# Patient Record
Sex: FEMALE | Race: WHITE | NOT HISPANIC OR LATINO | Employment: OTHER | URBAN - METROPOLITAN AREA
[De-identification: names, ages, dates, MRNs, and addresses within clinical notes are randomized per-mention and may not be internally consistent; named-entity substitution may affect disease eponyms.]

---

## 2022-05-03 ENCOUNTER — TELEPHONE (OUTPATIENT)
Dept: INFECTIOUS DISEASES | Facility: CLINIC | Age: 32
End: 2022-05-03
Payer: MEDICAID

## 2022-05-06 NOTE — TELEPHONE ENCOUNTER
Attempted to contact patient. No answer. Voicemail left to call clinic back. Text message sent to patient via company phone to call the clinic back. Unable to reach letter mailed to patient.

## 2022-06-08 ENCOUNTER — TELEPHONE (OUTPATIENT)
Dept: INFECTIOUS DISEASES | Facility: CLINIC | Age: 32
End: 2022-06-08

## 2022-06-08 ENCOUNTER — HOSPITAL ENCOUNTER (OUTPATIENT)
Dept: RADIOLOGY | Facility: HOSPITAL | Age: 32
Discharge: HOME | End: 2022-06-08
Attending: NURSE PRACTITIONER
Payer: MEDICAID

## 2022-06-08 ENCOUNTER — OFFICE VISIT (OUTPATIENT)
Dept: INFECTIOUS DISEASES | Facility: CLINIC | Age: 32
End: 2022-06-08
Payer: MEDICAID

## 2022-06-08 VITALS
TEMPERATURE: 99 F | HEART RATE: 112 BPM | RESPIRATION RATE: 18 BRPM | WEIGHT: 152.31 LBS | BODY MASS INDEX: 24.48 KG/M2 | SYSTOLIC BLOOD PRESSURE: 120 MMHG | HEIGHT: 66 IN | DIASTOLIC BLOOD PRESSURE: 77 MMHG

## 2022-06-08 DIAGNOSIS — B20 AIDS (ACQUIRED IMMUNODEFICIENCY SYNDROME), CD4 <=200: Primary | ICD-10-CM

## 2022-06-08 DIAGNOSIS — A74.9 CHLAMYDIA INFECTION: Primary | ICD-10-CM

## 2022-06-08 DIAGNOSIS — Z72.0 TOBACCO ABUSE: ICD-10-CM

## 2022-06-08 DIAGNOSIS — B20 AIDS (ACQUIRED IMMUNODEFICIENCY SYNDROME), CD4 <=200: ICD-10-CM

## 2022-06-08 DIAGNOSIS — E55.9 VITAMIN D DEFICIENCY: ICD-10-CM

## 2022-06-08 DIAGNOSIS — Z11.3 ROUTINE SCREENING FOR STI (SEXUALLY TRANSMITTED INFECTION): ICD-10-CM

## 2022-06-08 LAB
ALBUMIN SERPL-MCNC: 4.1 GM/DL (ref 3.5–5)
ALBUMIN/GLOB SERPL: 0.8 RATIO (ref 1.1–2)
ALP SERPL-CCNC: 161 UNIT/L (ref 40–150)
ALT SERPL-CCNC: 18 UNIT/L (ref 0–55)
AST SERPL-CCNC: 22 UNIT/L (ref 5–34)
BASOPHILS # BLD AUTO: 0.01 X10(3)/MCL (ref 0–0.2)
BASOPHILS NFR BLD AUTO: 0.2 %
BILIRUBIN DIRECT+TOT PNL SERPL-MCNC: 0.5 MG/DL
BUN SERPL-MCNC: 10.8 MG/DL (ref 8.9–20.6)
C TRACH DNA SPEC QL NAA+PROBE: DETECTED
CALCIUM SERPL-MCNC: 9.8 MG/DL (ref 8.4–10.2)
CHLORIDE SERPL-SCNC: 98 MMOL/L (ref 98–107)
CO2 SERPL-SCNC: 25 MMOL/L (ref 22–29)
CREAT SERPL-MCNC: 0.79 MG/DL (ref 0.73–1.18)
EOSINOPHIL # BLD AUTO: 0.02 X10(3)/MCL (ref 0–0.9)
EOSINOPHIL NFR BLD AUTO: 0.4 %
ERYTHROCYTE [DISTWIDTH] IN BLOOD BY AUTOMATED COUNT: 11.8 % (ref 11.5–17)
GLOBULIN SER-MCNC: 5.4 GM/DL (ref 2.4–3.5)
GLUCOSE SERPL-MCNC: 94 MG/DL (ref 74–100)
HCT VFR BLD AUTO: 45.4 % (ref 42–52)
HGB BLD-MCNC: 15.7 GM/DL (ref 14–18)
IMM GRANULOCYTES # BLD AUTO: 0.04 X10(3)/MCL (ref 0–0.02)
IMM GRANULOCYTES NFR BLD AUTO: 0.8 % (ref 0–0.43)
LYMPHOCYTES # BLD AUTO: 0.86 X10(3)/MCL (ref 0.6–4.6)
LYMPHOCYTES NFR BLD AUTO: 17.7 %
MCH RBC QN AUTO: 30.8 PG (ref 27–31)
MCHC RBC AUTO-ENTMCNC: 34.6 MG/DL (ref 33–36)
MCV RBC AUTO: 89 FL (ref 80–94)
MONOCYTES # BLD AUTO: 0.33 X10(3)/MCL (ref 0.1–1.3)
MONOCYTES NFR BLD AUTO: 6.8 %
N GONORRHOEA DNA SPEC QL NAA+PROBE: DETECTED
NEUTROPHILS # BLD AUTO: 3.6 X10(3)/MCL (ref 2.1–9.2)
NEUTROPHILS NFR BLD AUTO: 74.1 %
NRBC BLD AUTO-RTO: 0 %
PLATELET # BLD AUTO: 115 X10(3)/MCL (ref 130–400)
PLATELETS.RETICULATED NFR BLD AUTO: 15.4 % (ref 0.9–11.2)
PMV BLD AUTO: 13.9 FL (ref 9.4–12.4)
POTASSIUM SERPL-SCNC: 4.3 MMOL/L (ref 3.5–5.1)
PROT SERPL-MCNC: 9.5 GM/DL (ref 6.4–8.3)
RBC # BLD AUTO: 5.1 X10(6)/MCL (ref 4.7–6.1)
SODIUM SERPL-SCNC: 134 MMOL/L (ref 136–145)
WBC # SPEC AUTO: 4.9 X10(3)/MCL (ref 4.5–11.5)

## 2022-06-08 PROCEDURE — 71046 X-RAY EXAM CHEST 2 VIEWS: CPT | Mod: TC

## 2022-06-08 PROCEDURE — 87491 CHLMYD TRACH DNA AMP PROBE: CPT | Performed by: NURSE PRACTITIONER

## 2022-06-08 PROCEDURE — 0219U NFCT AGT HIV GNRJ SEQ ALYS: CPT | Performed by: NURSE PRACTITIONER

## 2022-06-08 PROCEDURE — 99204 PR OFFICE/OUTPT VISIT, NEW, LEVL IV, 45-59 MIN: ICD-10-PCS | Mod: S$PBB,,, | Performed by: NURSE PRACTITIONER

## 2022-06-08 PROCEDURE — 99204 OFFICE O/P NEW MOD 45 MIN: CPT | Mod: S$PBB,,, | Performed by: NURSE PRACTITIONER

## 2022-06-08 PROCEDURE — 3078F PR MOST RECENT DIASTOLIC BLOOD PRESSURE < 80 MM HG: ICD-10-PCS | Mod: CPTII,,, | Performed by: NURSE PRACTITIONER

## 2022-06-08 PROCEDURE — 1160F PR REVIEW ALL MEDS BY PRESCRIBER/CLIN PHARMACIST DOCUMENTED: ICD-10-PCS | Mod: CPTII,,, | Performed by: NURSE PRACTITIONER

## 2022-06-08 PROCEDURE — 86361 T CELL ABSOLUTE COUNT: CPT | Performed by: NURSE PRACTITIONER

## 2022-06-08 PROCEDURE — 3074F PR MOST RECENT SYSTOLIC BLOOD PRESSURE < 130 MM HG: ICD-10-PCS | Mod: CPTII,,, | Performed by: NURSE PRACTITIONER

## 2022-06-08 PROCEDURE — 99215 OFFICE O/P EST HI 40 MIN: CPT | Mod: PBBFAC,25 | Performed by: NURSE PRACTITIONER

## 2022-06-08 PROCEDURE — 87591 N.GONORRHOEAE DNA AMP PROB: CPT | Performed by: NURSE PRACTITIONER

## 2022-06-08 PROCEDURE — 36415 COLL VENOUS BLD VENIPUNCTURE: CPT | Performed by: NURSE PRACTITIONER

## 2022-06-08 PROCEDURE — 1159F MED LIST DOCD IN RCRD: CPT | Mod: CPTII,,, | Performed by: NURSE PRACTITIONER

## 2022-06-08 PROCEDURE — 3008F PR BODY MASS INDEX (BMI) DOCUMENTED: ICD-10-PCS | Mod: CPTII,,, | Performed by: NURSE PRACTITIONER

## 2022-06-08 PROCEDURE — 80053 COMPREHEN METABOLIC PANEL: CPT | Performed by: NURSE PRACTITIONER

## 2022-06-08 PROCEDURE — 3008F BODY MASS INDEX DOCD: CPT | Mod: CPTII,,, | Performed by: NURSE PRACTITIONER

## 2022-06-08 PROCEDURE — 85025 COMPLETE CBC W/AUTO DIFF WBC: CPT | Performed by: NURSE PRACTITIONER

## 2022-06-08 PROCEDURE — 3078F DIAST BP <80 MM HG: CPT | Mod: CPTII,,, | Performed by: NURSE PRACTITIONER

## 2022-06-08 PROCEDURE — 1160F RVW MEDS BY RX/DR IN RCRD: CPT | Mod: CPTII,,, | Performed by: NURSE PRACTITIONER

## 2022-06-08 PROCEDURE — 1159F PR MEDICATION LIST DOCUMENTED IN MEDICAL RECORD: ICD-10-PCS | Mod: CPTII,,, | Performed by: NURSE PRACTITIONER

## 2022-06-08 PROCEDURE — 3074F SYST BP LT 130 MM HG: CPT | Mod: CPTII,,, | Performed by: NURSE PRACTITIONER

## 2022-06-08 RX ORDER — QUETIAPINE FUMARATE 25 MG/1
25 TABLET, FILM COATED ORAL NIGHTLY
COMMUNITY
Start: 2022-05-05

## 2022-06-08 RX ORDER — MEDROXYPROGESTERONE ACETATE 5 MG/1
5 TABLET ORAL DAILY
COMMUNITY
Start: 2022-05-31

## 2022-06-08 RX ORDER — SPIRONOLACTONE 100 MG/1
100 TABLET, FILM COATED ORAL 2 TIMES DAILY
COMMUNITY
Start: 2022-05-05

## 2022-06-08 RX ORDER — DOXYCYCLINE HYCLATE 100 MG
100 TABLET ORAL EVERY 12 HOURS
Qty: 14 TABLET | Refills: 0 | Status: SHIPPED | OUTPATIENT
Start: 2022-06-08 | End: 2022-06-15

## 2022-06-08 RX ORDER — ERGOCALCIFEROL 1.25 MG/1
50000 CAPSULE ORAL
Qty: 4 CAPSULE | Refills: 4 | Status: SHIPPED | OUTPATIENT
Start: 2022-06-08 | End: 2022-06-08 | Stop reason: SDUPTHER

## 2022-06-08 RX ORDER — ESTRADIOL CYPIONATE 5 MG/ML
INJECTION INTRAMUSCULAR
COMMUNITY
Start: 2022-05-05

## 2022-06-08 RX ORDER — ERGOCALCIFEROL 1.25 MG/1
50000 CAPSULE ORAL
Qty: 4 CAPSULE | Refills: 4 | Status: SHIPPED | OUTPATIENT
Start: 2022-06-08 | End: 2022-09-28 | Stop reason: SDUPTHER

## 2022-06-08 RX ORDER — SULFAMETHOXAZOLE AND TRIMETHOPRIM 800; 160 MG/1; MG/1
1 TABLET ORAL DAILY
Qty: 30 TABLET | Refills: 2 | Status: SHIPPED | OUTPATIENT
Start: 2022-06-08 | End: 2022-09-28 | Stop reason: SDUPTHER

## 2022-06-08 RX ORDER — DEXTROAMPHETAMINE SULFATE, DEXTROAMPHETAMINE SACCHARATE, AMPHETAMINE SULFATE AND AMPHETAMINE ASPARTATE 7.5; 7.5; 7.5; 7.5 MG/1; MG/1; MG/1; MG/1
CAPSULE, EXTENDED RELEASE ORAL EVERY MORNING
COMMUNITY
Start: 2022-05-04

## 2022-06-08 RX ORDER — FLUOXETINE HYDROCHLORIDE 40 MG/1
40 CAPSULE ORAL DAILY
COMMUNITY
Start: 2022-05-27

## 2022-06-08 RX ORDER — AMOXICILLIN 500 MG/1
1000 CAPSULE ORAL 2 TIMES DAILY
COMMUNITY
Start: 2022-06-01 | End: 2022-06-13

## 2022-06-08 RX ORDER — ALPRAZOLAM 1 MG/1
1 TABLET ORAL 3 TIMES DAILY
COMMUNITY
Start: 2022-05-27

## 2022-06-08 NOTE — TELEPHONE ENCOUNTER
Reviewed labs from 6/8/22 rectal CT/GC positive for gonorrhea and chlamydia.  I notified patient. She is going to try to find a ride and will call back when she can come to clinic. Pt advised of the importance of refraining from sex for up to seven days after treatment. Explained that partner will need to be retreated to prevent infection.  I will send Doxycycline 100 mg 1 po BID x 7 days to [facility].  Pt will need Rocephin 500 mg IM with lidocaine as soon as he can come to clinic.

## 2022-06-08 NOTE — PROGRESS NOTES
Subjective:       Patient ID: Stan Odell is a 31 y.o. male.    Chief Complaint: HIV Positive/AIDS    Amber is a 31 WM who identifies as a female who presents today for initial HIV visit. Pt is accompanied by his mother Luciana Le.  Dx 4/7/22. Pt was referred by Dr. Michael Swenson.  MSM, anal receptive. No hx of STIs.  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 She tells me all sexual encounters were protected, but oral sex was preformed unprotected. Pt is  to a male, but currently . She is currently involved with a another male partner who is HIV negative.  HIV PrEP discussed for partner.  Hx of 1 tattoo and multiple piercings all of which were professional. Tells me she was tested 6-9 months ago for HIV and was negative. Smokes THC 2-3 x daily, smokes 1/2 cigs per day, and is drinking hard liquor daily to cope. Pt encouraged to see a counselor. She has one so she will call and schedule. Pt denies HI or SI.  Reviewed labs from 4/28/22 HIV VL [address], CD4 83, HIV GT susceptible to all NRTIs, NNRTIs, PI, Integrase. Only mutation noted was 128T, T Spot neg, cryptococcal antigen neg. CMV IGG +, Toxo IGG neg, Hep A IGG +, Hep B surface ab neg, TSH 0.9578, Vit D 17.1, HLA  neg, G6PD neg, lipid WNL, UDS + amphetamines, benzodiazepines, THC, and opiates.  Pt denies fever, headache, chills, visual problems, N/V/D, SOB, cough, chest pain or abd pain. Pt tells me she has lost a significant amount of weight over the last few months.  She will need a fundus photo and STI screening today. She verbalized understanding.    Spent ~ 1 hour 20 minutes with patient education and coordination of care    Review of Systems   Constitutional: Negative.    HENT: Negative.    Eyes: Negative.    Respiratory: Negative.    Cardiovascular: Negative.    Gastrointestinal: Negative.    Genitourinary: Negative.    Musculoskeletal: Negative.    Integumentary:  Negative.    Neurological: Negative.    Psychiatric/Behavioral: Negative.          Objective:      Physical Exam  Vitals reviewed.   Constitutional:       General: He is not in acute distress.     Appearance: Normal appearance.   HENT:      Head: Normocephalic.      Right Ear: Tympanic membrane, ear canal and external ear normal.      Left Ear: Tympanic membrane, ear canal and external ear normal.      Nose: Nose normal.      Mouth/Throat:      Mouth: Mucous membranes are moist.      Pharynx: Oropharynx is clear.   Eyes:      General: No scleral icterus.     Extraocular Movements: Extraocular movements intact.      Conjunctiva/sclera: Conjunctivae normal.      Pupils: Pupils are equal, round, and reactive to light.   Cardiovascular:      Rate and Rhythm: Normal rate and regular rhythm.      Pulses: Normal pulses.      Heart sounds: Normal heart sounds.   Pulmonary:      Effort: Pulmonary effort is normal. No respiratory distress.      Breath sounds: Normal breath sounds.   Abdominal:      General: Bowel sounds are normal. There is no distension.      Palpations: Abdomen is soft. There is no mass.      Tenderness: There is no abdominal tenderness. There is no right CVA tenderness or left CVA tenderness.      Hernia: No hernia is present.   Musculoskeletal:         General: No tenderness or signs of injury. Normal range of motion.      Cervical back: Normal range of motion and neck supple.      Right lower leg: No edema.      Left lower leg: No edema.   Lymphadenopathy:      Cervical: No cervical adenopathy.   Skin:     General: Skin is warm and dry.      Capillary Refill: Capillary refill takes less than 2 seconds.      Findings: No erythema or lesion.   Neurological:      General: No focal deficit present.      Mental Status: He is alert and oriented to person, place, and time. Mental status is at baseline.   Psychiatric:         Mood and Affect: Mood normal.         Behavior: Behavior normal.         Thought Content: Thought  content normal.         Judgment: Judgment normal.         Assessment:       Problem List Items Addressed This Visit    None     Visit Diagnoses     AIDS (acquired immunodeficiency syndrome), CD4 <=200    -  Primary    Relevant Medications    pmqjsudmw-vnnfmvss-volimtc ala (BIKTARVY) -25 mg (25 kg or greater)    Other Relevant Orders    HIV-1 RNA, Quantitative, PCR with Reflex to Genotype    CD4 Lymphocytes    CBC Auto Differential    Comprehensive Metabolic Panel    Vitamin D deficiency        Relevant Medications    ergocalciferol (VITAMIN D2) 50,000 unit Cap    Routine screening for STI (sexually transmitted infection)        Relevant Orders    Chlamydia/GC, PCR    C.trach/N.gonor AMP RNA, (Non-Genital)    Tobacco abuse        Relevant Orders    Ambulatory referral/consult to Smoking Cessation Program          Plan:       AIDS (acquired immunodeficiency syndrome), CD4 <=200  -     HIV-1 RNA, Quantitative, PCR with Reflex to Genotype; Future; Expected date: 06/08/2022  -     CD4 Lymphocytes; Future; Expected date: 06/08/2022  -     CBC Auto Differential; Future; Expected date: 06/08/2022  -     Comprehensive Metabolic Panel; Future; Expected date: 06/08/2022  -     Discontinue: qxezegcja-occoegny-wwsprmt ala (BIKTARVY) -25 mg (25 kg or greater); Take 1 tablet by mouth once daily.  Dispense: 30 tablet; Refill: 2  -     X-Ray Chest PA And Lateral; Future; Expected date: 06/08/2022  -     oeyxqvaxo-mhbjljjq-afpoqov ala (BIKTARVY) -25 mg (25 kg or greater); Take 1 tablet by mouth once daily.  Dispense: 30 tablet; Refill: 2  -     Ambulatory referral/consult to Ophthalmology; Future; Expected date: 06/15/2022  -     sulfamethoxazole-trimethoprim 800-160mg (BACTRIM DS) 800-160 mg Tab; Take 1 tablet by mouth once daily.  Dispense: 30 tablet; Refill: 2  HIV disease process and treatment discussed extensively with patient using education materials. Counseled on medication adherence/resistance as well as  importance of attending all scheduled follow up appointments/labs. Sexual protection with condoms recommended.  Plan to start Biktarvy 1 po q day as directed as wells as Bactrim DS 1 po q day for OI prophylaxis.   Pt encouraged to wash all fruits and veggies. Cook meat thoroughly. Avoid handling in litter boxes are gardening at this time.    RTC 6 weeks with Colleen, CXR and repeat labs today.  Fundus photo ordered   Hold vaccines until CD4 improves    Vitamin D deficiency  -     Discontinue: ergocalciferol (VITAMIN D2) 50,000 unit Cap; Take 1 capsule (50,000 Units total) by mouth every 7 days.  Dispense: 4 capsule; Refill: 4  -     ergocalciferol (VITAMIN D2) 50,000 unit Cap; Take 1 capsule (50,000 Units total) by mouth every 7 days.  Dispense: 4 capsule; Refill: 4  Restart Ergocalciferol 50,000 units once weekly. Pt educated on importance of Vit D to bone and organ health.     Routine screening for STI (sexually transmitted infection)  -     Chlamydia/GC, PCR; Future; Expected date: 06/08/2022  -     C.trach/N.gonor AMP RNA, (Non-Genital); Future; Expected date: 06/08/2022  Oral and rectal CT/GC self collected     Tobacco abuse  -     Ambulatory referral/consult to Smoking Cessation Program; Future; Expected date: 06/15/2022  Smoking cessation encouraged.  Pt is not ready to quit.  Discussed benefits of quitting: improved overall health, decreased cardiac/vascular/pulmonary/stroke risks, as well as cost savings.

## 2022-06-09 LAB
AGE: 29
CD3+CD4+ CELLS # BLD: 18 % (ref 28–48)
CD3+CD4+ CELLS # SPEC: 77.62 UNIT/L (ref 589–1505)
CD3+CD4+ CELLS NFR BLD: 8.8 %
LYMPHOCYTES # BLD AUTO: 882 X10(3)/MCL (ref 1260–5520)
LYMPHOMA - T-CELL MARKERS SPEC-IMP: ABNORMAL
WBC # BLD AUTO: 4900 /MM3 (ref 4500–11500)

## 2022-06-11 LAB
C TRACH RRNA SPEC QL NAA+PROBE: NEGATIVE
HIV1 RNA # PLAS NAA DL=20: ABNORMAL COPIES/ML
N GONORRHOEA RRNA SPEC QL NAA+PROBE: NEGATIVE
SPECIMEN SOURCE: NORMAL
SPECIMEN SOURCE: NORMAL

## 2022-06-13 ENCOUNTER — TELEPHONE (OUTPATIENT)
Dept: INFECTIOUS DISEASES | Facility: CLINIC | Age: 32
End: 2022-06-13

## 2022-06-13 ENCOUNTER — CLINICAL SUPPORT (OUTPATIENT)
Dept: INFECTIOUS DISEASES | Facility: CLINIC | Age: 32
End: 2022-06-13
Payer: MEDICAID

## 2022-06-13 DIAGNOSIS — A54.9 GONORRHEA: Primary | ICD-10-CM

## 2022-06-13 DIAGNOSIS — A74.9 CHLAMYDIA INFECTION: Primary | ICD-10-CM

## 2022-06-13 PROCEDURE — 96372 THER/PROPH/DIAG INJ SC/IM: CPT | Mod: PBBFAC

## 2022-06-13 PROCEDURE — 99211 OFF/OP EST MAY X REQ PHY/QHP: CPT | Mod: 25,PBBFAC

## 2022-06-13 RX ORDER — LIDOCAINE HYDROCHLORIDE 10 MG/ML
1 INJECTION INFILTRATION; PERINEURAL
Status: COMPLETED | OUTPATIENT
Start: 2022-06-13 | End: 2022-06-13

## 2022-06-13 RX ORDER — CEFTRIAXONE 1 G/1
0.5 INJECTION, POWDER, FOR SOLUTION INTRAMUSCULAR; INTRAVENOUS
Status: COMPLETED | OUTPATIENT
Start: 2022-06-13 | End: 2022-06-13

## 2022-06-13 RX ADMIN — CEFTRIAXONE SODIUM 0.5 G: 500 INJECTION, POWDER, FOR SOLUTION INTRAMUSCULAR; INTRAVENOUS at 03:06

## 2022-06-13 RX ADMIN — LIDOCAINE HYDROCHLORIDE 1 ML: 10 INJECTION INFILTRATION; PERINEURAL at 03:06

## 2022-06-13 NOTE — TELEPHONE ENCOUNTER
----- Message from Migdalia Treviño sent at 6/13/2022 12:13 PM CDT -----  Regarding: Pt called  #KATYA PT#  Pt called wanted to let Katya know that he will come today @3:00 to get his shots.  [phone]

## 2022-06-16 LAB — HIV 1 RNA RT + PR + IN MUT DET PLAS SEQ: NORMAL

## 2022-09-28 DIAGNOSIS — E55.9 VITAMIN D DEFICIENCY: ICD-10-CM

## 2022-09-28 DIAGNOSIS — B20 AIDS (ACQUIRED IMMUNODEFICIENCY SYNDROME), CD4 <=200: ICD-10-CM

## 2022-09-28 RX ORDER — ERGOCALCIFEROL 1.25 MG/1
50000 CAPSULE ORAL
Qty: 4 CAPSULE | Refills: 0 | Status: SHIPPED | OUTPATIENT
Start: 2022-09-28 | End: 2023-02-01 | Stop reason: SDUPTHER

## 2022-09-28 RX ORDER — SULFAMETHOXAZOLE AND TRIMETHOPRIM 800; 160 MG/1; MG/1
1 TABLET ORAL DAILY
Qty: 30 TABLET | Refills: 0 | Status: SHIPPED | OUTPATIENT
Start: 2022-09-28 | End: 2022-10-25

## 2022-09-28 NOTE — TELEPHONE ENCOUNTER
----- Message from Migdalia Treviño sent at 9/28/2022 11:52 AM CDT -----  Regarding: Pt called  #KATYA#    Pt request new Rx Vit D/Bactrim/Biktarvy  [facility]        Pt call back number [phone].

## 2022-11-07 ENCOUNTER — TELEPHONE (OUTPATIENT)
Dept: INFECTIOUS DISEASES | Facility: CLINIC | Age: 32
End: 2022-11-07
Payer: MEDICAID

## 2022-11-07 NOTE — TELEPHONE ENCOUNTER
Spoke to Joseph, she stated pt had labs done there today and seen the doctor last week, I informed her that pt is scheduled for an appt on tomorrow, she will fax his labs and last office note

## 2022-11-07 NOTE — TELEPHONE ENCOUNTER
----- Message from Jo-Ann Wolff sent at 11/7/2022 12:24 PM CST -----  Regarding: Lab results  KATYA Ruiz from [facility] called stating that the patients platelet count is low.   She can be reached @  [phone]

## 2022-11-10 ENCOUNTER — TELEPHONE (OUTPATIENT)
Dept: INFECTIOUS DISEASES | Facility: CLINIC | Age: 32
End: 2022-11-10
Payer: MEDICAID

## 2022-11-10 NOTE — TELEPHONE ENCOUNTER
Mara,     I reviewed labs from [facility] done 11/1/22 platelets are 95.  Pt will need further workup by his PCP. I have not seen patient in the clinic since 6/8/22. I started him on Biktarvy and he was taking Bactrim, but I have not seen him back in clinic for follow up. Pt does not have a an appt scheduled. Can you send information to his PCP and schedule an appointment to see me? Labs are scanned in the computer.    Thank you,   KASIA Esteban

## 2022-11-11 NOTE — TELEPHONE ENCOUNTER
Faxed lab results to Dr. Kelly at [facility] per Colleen's request. Attempted to contact patient to schedule an appt. Number is not reachable at this time.

## 2022-11-15 NOTE — TELEPHONE ENCOUNTER
"Attempted to contact patient. "Wireless customer you are calling is unavailable." Not able to send letter due to invalid home address.   "

## 2023-02-01 ENCOUNTER — HOSPITAL ENCOUNTER (OUTPATIENT)
Dept: RADIOLOGY | Facility: HOSPITAL | Age: 33
Discharge: HOME | End: 2023-02-01
Attending: NURSE PRACTITIONER
Payer: MEDICAID

## 2023-02-01 ENCOUNTER — OFFICE VISIT (OUTPATIENT)
Dept: INFECTIOUS DISEASES | Facility: CLINIC | Age: 33
End: 2023-02-01
Payer: MEDICAID

## 2023-02-01 VITALS
HEART RATE: 100 BPM | BODY MASS INDEX: 24.86 KG/M2 | HEIGHT: 66 IN | TEMPERATURE: 99 F | SYSTOLIC BLOOD PRESSURE: 108 MMHG | RESPIRATION RATE: 16 BRPM | WEIGHT: 154.69 LBS | DIASTOLIC BLOOD PRESSURE: 67 MMHG

## 2023-02-01 DIAGNOSIS — K59.00 CONSTIPATION, UNSPECIFIED CONSTIPATION TYPE: ICD-10-CM

## 2023-02-01 DIAGNOSIS — B20 AIDS (ACQUIRED IMMUNODEFICIENCY SYNDROME), CD4 <=200: Primary | ICD-10-CM

## 2023-02-01 DIAGNOSIS — E55.9 VITAMIN D DEFICIENCY: ICD-10-CM

## 2023-02-01 DIAGNOSIS — F17.210 CIGARETTE NICOTINE DEPENDENCE WITHOUT COMPLICATION: ICD-10-CM

## 2023-02-01 DIAGNOSIS — K62.5 RECTAL BLEED: ICD-10-CM

## 2023-02-01 DIAGNOSIS — Z11.3 ROUTINE SCREENING FOR STI (SEXUALLY TRANSMITTED INFECTION): ICD-10-CM

## 2023-02-01 LAB
ALBUMIN SERPL-MCNC: 4.1 G/DL (ref 3.5–5)
ALBUMIN/GLOB SERPL: 0.9 RATIO (ref 1.1–2)
ALP SERPL-CCNC: 138 UNIT/L (ref 40–150)
ALT SERPL-CCNC: 17 UNIT/L (ref 0–55)
APPEARANCE UR: ABNORMAL
AST SERPL-CCNC: 16 UNIT/L (ref 5–34)
BACTERIA #/AREA URNS AUTO: ABNORMAL /HPF
BASOPHILS # BLD AUTO: 0.03 X10(3)/MCL (ref 0–0.2)
BASOPHILS NFR BLD AUTO: 0.3 %
BILIRUB UR QL STRIP.AUTO: NEGATIVE MG/DL
BILIRUBIN DIRECT+TOT PNL SERPL-MCNC: 0.4 MG/DL
BUN SERPL-MCNC: 14.4 MG/DL (ref 8.9–20.6)
C TRACH DNA SPEC QL NAA+PROBE: DETECTED
CALCIUM SERPL-MCNC: 9.8 MG/DL (ref 8.4–10.2)
CHLORIDE SERPL-SCNC: 97 MMOL/L (ref 98–107)
CHOLEST SERPL-MCNC: 146 MG/DL
CHOLEST/HDLC SERPL: 3 {RATIO} (ref 0–5)
CO2 SERPL-SCNC: 26 MMOL/L (ref 22–29)
COLOR UR AUTO: ABNORMAL
CREAT SERPL-MCNC: 0.83 MG/DL (ref 0.73–1.18)
DEPRECATED CALCIDIOL+CALCIFEROL SERPL-MC: 18.9 NG/ML (ref 30–80)
EOSINOPHIL # BLD AUTO: 0.03 X10(3)/MCL (ref 0–0.9)
EOSINOPHIL NFR BLD AUTO: 0.3 %
ERYTHROCYTE [DISTWIDTH] IN BLOOD BY AUTOMATED COUNT: 12.4 % (ref 11.5–17)
GFR SERPLBLD CREATININE-BSD FMLA CKD-EPI: >60 MLS/MIN/1.73/M2
GLOBULIN SER-MCNC: 4.8 GM/DL (ref 2.4–3.5)
GLUCOSE SERPL-MCNC: 92 MG/DL (ref 74–100)
GLUCOSE UR QL STRIP.AUTO: NORMAL MG/DL
HCT VFR BLD AUTO: 44 % (ref 42–52)
HCV AB SERPL QL IA: NONREACTIVE
HDLC SERPL-MCNC: 44 MG/DL (ref 35–60)
HGB BLD-MCNC: 14.9 GM/DL (ref 14–18)
HYALINE CASTS #/AREA URNS LPF: ABNORMAL /LPF
IMM GRANULOCYTES # BLD AUTO: 0.02 X10(3)/MCL (ref 0–0.04)
IMM GRANULOCYTES NFR BLD AUTO: 0.2 %
KETONES UR QL STRIP.AUTO: ABNORMAL MG/DL
LDLC SERPL CALC-MCNC: 89 MG/DL (ref 50–140)
LEUKOCYTE ESTERASE UR QL STRIP.AUTO: NEGATIVE UNIT/L
LYMPHOCYTES # BLD AUTO: 1.5 X10(3)/MCL (ref 0.6–4.6)
LYMPHOCYTES NFR BLD AUTO: 16.8 %
MCH RBC QN AUTO: 30.7 PG
MCHC RBC AUTO-ENTMCNC: 33.9 MG/DL (ref 33–36)
MCV RBC AUTO: 90.5 FL (ref 80–94)
MONOCYTES # BLD AUTO: 0.49 X10(3)/MCL (ref 0.1–1.3)
MONOCYTES NFR BLD AUTO: 5.5 %
MUCOUS THREADS URNS QL MICRO: ABNORMAL /LPF
N GONORRHOEA DNA SPEC QL NAA+PROBE: NOT DETECTED
NEUTROPHILS # BLD AUTO: 6.88 X10(3)/MCL (ref 2.1–9.2)
NEUTROPHILS NFR BLD AUTO: 76.9 %
NITRITE UR QL STRIP.AUTO: NEGATIVE
NRBC BLD AUTO-RTO: 0 %
PH UR STRIP.AUTO: 7 [PH]
PLATELET # BLD AUTO: 231 X10(3)/MCL (ref 130–400)
PMV BLD AUTO: 12.9 FL (ref 7.4–10.4)
POTASSIUM SERPL-SCNC: 4.5 MMOL/L (ref 3.5–5.1)
PROT SERPL-MCNC: 8.9 GM/DL (ref 6.4–8.3)
PROT UR QL STRIP.AUTO: NEGATIVE MG/DL
RBC # BLD AUTO: 4.86 X10(6)/MCL (ref 4.7–6.1)
RBC #/AREA URNS AUTO: ABNORMAL /HPF
RBC UR QL AUTO: NEGATIVE UNIT/L
SODIUM SERPL-SCNC: 133 MMOL/L (ref 136–145)
SP GR UR STRIP.AUTO: 1.02
SQUAMOUS #/AREA URNS LPF: ABNORMAL /HPF
T PALLIDUM AB SER QL: NONREACTIVE
TRIGL SERPL-MCNC: 67 MG/DL (ref 34–140)
TSH SERPL-ACNC: 3.36 UIU/ML (ref 0.35–4.94)
UROBILINOGEN UR STRIP-ACNC: NORMAL MG/DL
VLDLC SERPL CALC-MCNC: 13 MG/DL
WBC # SPEC AUTO: 9 X10(3)/MCL (ref 4.5–11.5)
WBC #/AREA URNS AUTO: ABNORMAL /HPF

## 2023-02-01 PROCEDURE — 99215 OFFICE O/P EST HI 40 MIN: CPT | Mod: 25,S$PBB,, | Performed by: NURSE PRACTITIONER

## 2023-02-01 PROCEDURE — 1160F RVW MEDS BY RX/DR IN RCRD: CPT | Mod: CPTII,,, | Performed by: NURSE PRACTITIONER

## 2023-02-01 PROCEDURE — 99406 PR TOBACCO USE CESSATION INTERMEDIATE 3-10 MINUTES: ICD-10-PCS | Mod: S$PBB,,, | Performed by: NURSE PRACTITIONER

## 2023-02-01 PROCEDURE — 1159F PR MEDICATION LIST DOCUMENTED IN MEDICAL RECORD: ICD-10-PCS | Mod: CPTII,,, | Performed by: NURSE PRACTITIONER

## 2023-02-01 PROCEDURE — 87536 HIV-1 QUANT&REVRSE TRNSCRPJ: CPT | Performed by: NURSE PRACTITIONER

## 2023-02-01 PROCEDURE — 86360 T CELL ABSOLUTE COUNT/RATIO: CPT | Performed by: NURSE PRACTITIONER

## 2023-02-01 PROCEDURE — 86803 HEPATITIS C AB TEST: CPT | Performed by: NURSE PRACTITIONER

## 2023-02-01 PROCEDURE — 36415 COLL VENOUS BLD VENIPUNCTURE: CPT | Performed by: NURSE PRACTITIONER

## 2023-02-01 PROCEDURE — 87591 N.GONORRHOEAE DNA AMP PROB: CPT | Performed by: NURSE PRACTITIONER

## 2023-02-01 PROCEDURE — 82306 VITAMIN D 25 HYDROXY: CPT | Performed by: NURSE PRACTITIONER

## 2023-02-01 PROCEDURE — 1160F PR REVIEW ALL MEDS BY PRESCRIBER/CLIN PHARMACIST DOCUMENTED: ICD-10-PCS | Mod: CPTII,,, | Performed by: NURSE PRACTITIONER

## 2023-02-01 PROCEDURE — 99215 PR OFFICE/OUTPT VISIT, EST, LEVL V, 40-54 MIN: ICD-10-PCS | Mod: 25,S$PBB,, | Performed by: NURSE PRACTITIONER

## 2023-02-01 PROCEDURE — 74019 RADEX ABDOMEN 2 VIEWS: CPT | Mod: TC

## 2023-02-01 PROCEDURE — 86480 TB TEST CELL IMMUN MEASURE: CPT | Performed by: NURSE PRACTITIONER

## 2023-02-01 PROCEDURE — 1159F MED LIST DOCD IN RCRD: CPT | Mod: CPTII,,, | Performed by: NURSE PRACTITIONER

## 2023-02-01 PROCEDURE — 81001 URINALYSIS AUTO W/SCOPE: CPT | Performed by: NURSE PRACTITIONER

## 2023-02-01 PROCEDURE — 3074F PR MOST RECENT SYSTOLIC BLOOD PRESSURE < 130 MM HG: ICD-10-PCS | Mod: CPTII,,, | Performed by: NURSE PRACTITIONER

## 2023-02-01 PROCEDURE — 3078F PR MOST RECENT DIASTOLIC BLOOD PRESSURE < 80 MM HG: ICD-10-PCS | Mod: CPTII,,, | Performed by: NURSE PRACTITIONER

## 2023-02-01 PROCEDURE — 3008F PR BODY MASS INDEX (BMI) DOCUMENTED: ICD-10-PCS | Mod: CPTII,,, | Performed by: NURSE PRACTITIONER

## 2023-02-01 PROCEDURE — 80061 LIPID PANEL: CPT | Performed by: NURSE PRACTITIONER

## 2023-02-01 PROCEDURE — 80053 COMPREHEN METABOLIC PANEL: CPT | Performed by: NURSE PRACTITIONER

## 2023-02-01 PROCEDURE — 3008F BODY MASS INDEX DOCD: CPT | Mod: CPTII,,, | Performed by: NURSE PRACTITIONER

## 2023-02-01 PROCEDURE — 99215 OFFICE O/P EST HI 40 MIN: CPT | Mod: PBBFAC | Performed by: NURSE PRACTITIONER

## 2023-02-01 PROCEDURE — 99406 BEHAV CHNG SMOKING 3-10 MIN: CPT | Mod: S$PBB,,, | Performed by: NURSE PRACTITIONER

## 2023-02-01 PROCEDURE — 86780 TREPONEMA PALLIDUM: CPT | Performed by: NURSE PRACTITIONER

## 2023-02-01 PROCEDURE — 85025 COMPLETE CBC W/AUTO DIFF WBC: CPT | Performed by: NURSE PRACTITIONER

## 2023-02-01 PROCEDURE — 87491 CHLMYD TRACH DNA AMP PROBE: CPT | Performed by: NURSE PRACTITIONER

## 2023-02-01 PROCEDURE — 3078F DIAST BP <80 MM HG: CPT | Mod: CPTII,,, | Performed by: NURSE PRACTITIONER

## 2023-02-01 PROCEDURE — 3074F SYST BP LT 130 MM HG: CPT | Mod: CPTII,,, | Performed by: NURSE PRACTITIONER

## 2023-02-01 PROCEDURE — 84443 ASSAY THYROID STIM HORMONE: CPT | Performed by: NURSE PRACTITIONER

## 2023-02-01 RX ORDER — ESTRADIOL 2 MG/1
4 TABLET ORAL DAILY
COMMUNITY

## 2023-02-01 RX ORDER — SULFAMETHOXAZOLE AND TRIMETHOPRIM 800; 160 MG/1; MG/1
1 TABLET ORAL DAILY
Qty: 30 TABLET | Refills: 3 | Status: SHIPPED | OUTPATIENT
Start: 2023-02-01 | End: 2023-02-08 | Stop reason: SDUPTHER

## 2023-02-01 RX ORDER — ERGOCALCIFEROL 1.25 MG/1
50000 CAPSULE ORAL
Qty: 4 CAPSULE | Refills: 3 | Status: SHIPPED | OUTPATIENT
Start: 2023-02-01 | End: 2023-02-08 | Stop reason: SDUPTHER

## 2023-02-01 RX ORDER — BICTEGRAVIR SODIUM, EMTRICITABINE, AND TENOFOVIR ALAFENAMIDE FUMARATE 50; 200; 25 MG/1; MG/1; MG/1
1 TABLET ORAL DAILY
Qty: 30 TABLET | Refills: 3 | Status: SHIPPED | OUTPATIENT
Start: 2023-02-01 | End: 2023-02-08 | Stop reason: SDUPTHER

## 2023-02-01 NOTE — PROGRESS NOTES
Subjective:       Patient ID: Stan Odell is a 32 y.o. male.    Chief Complaint: Follow-up (B20)    2/1/23  Amber is a 33 y/o WM who identifies as female who here for HIV f/u.  Dx 4/7/22. MSM, anal receptive.  Hx of rectal gonorrhea and chlamydia. G6PD neg, HLA  neg. Pt reports 100% adherence to Biktarvy despite not attending appts. She reports non-adherence to Bactrim DS. Reports adherence to Ergocalciferol 50,000 units once weekly.  Reviewed labs from 6/8/22 HIV VL [address]486, CD4 77, rectal CT/GC positive. Pt denies fever, headache, chills, visual problems, N/V/D, SOB, cough, or chest pain. She complains of ongoing constipation and rectal bleeding for approx 1.5 months. Pt states she used a dildo and then started having some rectal bleeding. She is unsure if she injured something or she is has an anal fissure from the constipation. Pt will need some updated labs, x ray abdomen, repeat STI screening and FIT test today. 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  She is still having some depression and is currently seeing a counselor, but would really like to see someone who specializes in HIV.  I will provide patient with a list of counselors today.  She denies SI or HI.  Pt is due for several vaccines. However, CD4 remains to low. Pt is currently smoking 1/2 ppd og cigarettes. Smoking cessation encouraged.    6/8/22  Amber is a 31 WM who identifies as a female who presents today for initial HIV visit. Pt is accompanied by his mother Luciana Le.  Dx 4/7/22. Pt was referred by Dr. Michael Swenson.  MSM, anal receptive. No hx of STIs.  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 She tells me all sexual encounters were protected, but oral sex was preformed unprotected. Pt is  to a male, but currently . She is currently involved with a another male partner who is HIV  negative.  HIV PrEP discussed for partner.  Hx of 1 tattoo and multiple piercings all of which were professional. Tells me she was tested 6-9 months ago for HIV and was negative. Smokes THC 2-3 x daily, smokes 1/2 cigs per day, and is drinking hard liquor daily to cope. Pt encouraged to see a counselor. She has one so she will call and schedule. Pt denies HI or SI.  Reviewed labs from 4/28/22 HIV VL [address], CD4 83, HIV GT susceptible to all NRTIs, NNRTIs, PI, Integrase. Only mutation noted was 128T, T Spot neg, cryptococcal antigen neg. CMV IGG +, Toxo IGG neg, Hep A IGG +, Hep B surface ab neg, TSH 0.9578, Vit D 17.1, HLA  neg, G6PD neg, lipid WNL, UDS + amphetamines, benzodiazepines, THC, and opiates.  Pt denies fever, headache, chills, visual problems, N/V/D, SOB, cough, chest pain or abd pain. Pt tells me she has lost a significant amount of weight over the last few months.  She will need a fundus photo and STI screening today. She verbalized understanding.    Spent ~ 1 hour 20 minutes with patient education and coordination of care.    I spent approx 50 minutes on this patient. This includes face to face time and non-face to face time preparing to see the patient (eg, review of tests), obtaining and/or reviewing separately obtained history, documenting clinical information in the electronic or other health record, independently interpreting results and communicating results to the patient/family/caregiver, or care coordinator.       Review of Systems   Constitutional: Negative.    HENT: Negative.     Eyes: Negative.    Respiratory: Negative.     Cardiovascular: Negative.    Gastrointestinal:  Positive for blood in stool.   Genitourinary: Negative.    Musculoskeletal: Negative.    Integumentary:  Negative.   Neurological: Negative.    Psychiatric/Behavioral: Negative.         Objective:      Physical Exam  Vitals reviewed. Exam conducted with a chaperone present.   Constitutional:       General: He is not in  acute distress.     Appearance: Normal appearance.   HENT:      Head: Normocephalic.      Right Ear: External ear normal.      Left Ear: External ear normal.      Nose: Nose normal.      Mouth/Throat:      Mouth: Mucous membranes are moist.      Pharynx: Oropharynx is clear.   Eyes:      General: No scleral icterus.     Extraocular Movements: Extraocular movements intact.      Conjunctiva/sclera: Conjunctivae normal.      Pupils: Pupils are equal, round, and reactive to light.   Cardiovascular:      Rate and Rhythm: Normal rate and regular rhythm.      Pulses: Normal pulses.      Heart sounds: Normal heart sounds.   Abdominal:      General: Abdomen is flat. Bowel sounds are normal.      Palpations: Abdomen is soft. There is no mass.      Tenderness: There is no abdominal tenderness. There is no right CVA tenderness or left CVA tenderness.   Genitourinary:     Penis: Normal.       Testes: Normal.      Prostate: Normal.      Rectum: No mass, tenderness, anal fissure, external hemorrhoid or internal hemorrhoid. Abnormal anal tone.      Comments: Low anal sphincter tone noted, stool is liquid brown, no blood noted, no mass, hemorrhoid, or fissure noted  Musculoskeletal:         General: No swelling or deformity. Normal range of motion.      Cervical back: Normal range of motion and neck supple.      Right lower leg: No edema.      Left lower leg: No edema.   Lymphadenopathy:      Cervical: No cervical adenopathy.   Skin:     General: Skin is warm and dry.      Capillary Refill: Capillary refill takes less than 2 seconds.      Findings: No erythema or lesion.   Neurological:      General: No focal deficit present.      Mental Status: He is alert and oriented to person, place, and time. Mental status is at baseline.   Psychiatric:         Mood and Affect: Mood normal.         Behavior: Behavior normal.         Thought Content: Thought content normal.         Judgment: Judgment normal.       Assessment:       Problem List  Items Addressed This Visit    None  Visit Diagnoses       AIDS (acquired immunodeficiency syndrome), CD4 <=200    -  Primary    Relevant Medications    ergocalciferol (VITAMIN D2) 50,000 unit Cap    evirgkayv-rswhsyfh-zqikskj ala (BIKTARVY) -25 mg (25 kg or greater)    sulfamethoxazole-trimethoprim 800-160mg (BACTRIM DS) 800-160 mg Tab    Other Relevant Orders    HIV-1 RNA, Quantitative, PCR with Reflex to Genotype    CD4 Lymphocytes    CBC Auto Differential    Comprehensive Metabolic Panel    TSH    Urinalysis    Lipid Panel    Hepatitis C Antibody    Quantiferon Gold TB    Ambulatory referral/consult to Ophthalmology    Vitamin D deficiency        Relevant Medications    ergocalciferol (VITAMIN D2) 50,000 unit Cap    Other Relevant Orders    Vitamin D    Routine screening for STI (sexually transmitted infection)        Relevant Orders    Chlamydia/GC, PCR    C.trach/N.gonor AMP RNA    Chlamydia/GC, PCR    SYPHILIS ANTIBODY (WITH REFLEX RPR)    Rectal bleed        Relevant Orders    OCCULT BLOOD FECAL IMMUNOASSAY    Cigarette nicotine dependence without complication        Constipation, unspecified constipation type        Relevant Orders    X-Ray Abdomen Flat And Erect            Plan:       AIDS (acquired immunodeficiency syndrome), CD4 <=200  -     ergocalciferol (VITAMIN D2) 50,000 unit Cap; Take 1 capsule (50,000 Units total) by mouth every 7 days.  Dispense: 4 capsule; Refill: 3  -     cnwowrbnb-yblucuqt-wmierrl ala (BIKTARVY) -25 mg (25 kg or greater); Take 1 tablet by mouth once daily.  Dispense: 30 tablet; Refill: 3  -     sulfamethoxazole-trimethoprim 800-160mg (BACTRIM DS) 800-160 mg Tab; Take 1 tablet by mouth once daily.  Dispense: 30 tablet; Refill: 3  -     HIV-1 RNA, Quantitative, PCR with Reflex to Genotype; Future; Expected date: 02/01/2023  -     CD4 Lymphocytes; Future; Expected date: 02/01/2023  -     CBC Auto Differential; Future; Expected date: 02/01/2023  -     Comprehensive  Metabolic Panel; Future; Expected date: 02/01/2023  -     TSH; Future; Expected date: 02/01/2023  -     Urinalysis; Future; Expected date: 02/01/2023  -     Lipid Panel; Future; Expected date: 02/01/2023  -     Hepatitis C Antibody; Future; Expected date: 02/01/2023  -     Quantiferon Gold TB; Future; Expected date: 02/01/2023  -     Ambulatory referral/consult to Ophthalmology; Future; Expected date: 02/08/2023  Continue Biktarvy 1 po q day. Restart Bactrim DS 1 po q day for OI prophylaxis.   Pt encouraged to wash all fruits and veggies. Cook meat thoroughly. Avoid handling litter boxes are gardening at this time.    RTC 3 months with Colleen, labs and abd x ray today  FIT test ordered   STI screening repeated 2/1/23  Fundus photo ordered   Hold vaccines until CD4 improves    Vitamin D deficiency  -     ergocalciferol (VITAMIN D2) 50,000 unit Cap; Take 1 capsule (50,000 Units total) by mouth every 7 days.  Dispense: 4 capsule; Refill: 3  -     Vitamin D; Future; Expected date: 02/01/2023  Continue meds. Pt educated on importance of Vit D to bone and organ health.     Routine screening for STI (sexually transmitted infection)  -     Chlamydia/GC, PCR; Future; Expected date: 02/01/2023  -     C.trach/N.gonor AMP RNA; Future; Expected date: 02/01/2023  -     Chlamydia/GC, PCR  -     SYPHILIS ANTIBODY (WITH REFLEX RPR); Future; Expected date: 02/01/2023  Oral and rectal CT/GC collected     Rectal bleed  -     OCCULT BLOOD FECAL IMMUNOASSAY; Future; Expected date: 02/01/2023    Cigarette nicotine dependence without complication  Spent approx 3 minutes discussing smoking cessation   Discussed benefits of quitting: improved overall health, decreased cardiac/vascular/pulmonary/stroke risks, as well as cost savings.     Constipation, unspecified constipation type  -     X-Ray Abdomen Flat And Erect; Future; Expected date: 02/01/2023  Increase water and fiber intake   X ray of abdomen today

## 2023-02-02 ENCOUNTER — TELEPHONE (OUTPATIENT)
Dept: INFECTIOUS DISEASES | Facility: CLINIC | Age: 33
End: 2023-02-02
Payer: MEDICAID

## 2023-02-02 DIAGNOSIS — A74.9 CHLAMYDIA: Primary | ICD-10-CM

## 2023-02-02 RX ORDER — DOXYCYCLINE HYCLATE 100 MG
100 TABLET ORAL 2 TIMES DAILY
Qty: 14 TABLET | Refills: 0 | Status: SHIPPED | OUTPATIENT
Start: 2023-02-02 | End: 2023-02-08 | Stop reason: SDUPTHER

## 2023-02-02 NOTE — PROGRESS NOTES
Reviewed labs. Vit D 18.9. Please encourage patient to to take Ergocalciferol 50,000 units once weekly as directed. Rectal Chlamydia test positive 2/1/23. Pt will need to start Doxycycline 100 mg 1 po BID x 7 days.  Partner will need to be treated as well.  Pt will need to remain upright for at least 30 minutes to prevent pill esophagitis and will need sun protection d/t photosensitivity with medication (pt will need to avoid direct sun exposure, wear sunscreen, wear sunglasses, wear long sleeves/skin protection, etc). Rx sent to pharmacy [facility] in [facility] which is [facility].

## 2023-02-02 NOTE — TELEPHONE ENCOUNTER
Pt has a positive rectal chlamydia test. I tried to contact patient. No answer. VM left for patient to contact me. Patient and her partner will need to be treated.  Rx for Doxycycline 100 mg 1 po BID sent to pharmacy listed [facility].

## 2023-02-03 LAB
C TRACH RRNA SPEC QL NAA+PROBE: NEGATIVE
GAMMA INTERFERON BACKGROUND BLD IA-ACNC: 0.21 IU/ML
HIV1 RNA # PLAS NAA DL=20: NORMAL COPIES/ML
M TB IFN-G BLD-IMP: NEGATIVE
M TB IFN-G CD4+ BCKGRND COR BLD-ACNC: -0.14 IU/ML
M TB IFN-G CD4+CD8+ BCKGRND COR BLD-ACNC: -0.14 IU/ML
MITOGEN IGNF BCKGRD COR BLD-ACNC: >10 IU/ML
N GONORRHOEA RRNA SPEC QL NAA+PROBE: NEGATIVE
SPECIMEN SOURCE: NORMAL
SPECIMEN SOURCE: NORMAL

## 2023-02-05 LAB
CD3 CELLS # BLD: 969 CELLS/MCL (ref 550–2202)
CD3 CELLS NFR BLD: 85 % (ref 58–86)
CD3+CD4+ CELLS # BLD: 166 CELLS/MCL (ref 365–1437)
CD3+CD4+ CELLS NFR BLD: 15 % (ref 32–64)
CD3+CD4+ CELLS/CD3+CD8+ CLL BLD: 0.2 %
CD3+CD8+ CELLS # BLD: 794 CELLS/MCL (ref 171–846)
CD3+CD8+ CELLS NFR BLD: 70 % (ref 15–40)
CD45 CELLS # BLD: 1.14 THOU/MCL (ref 0.82–2.84)

## 2023-02-08 DIAGNOSIS — B20 AIDS (ACQUIRED IMMUNODEFICIENCY SYNDROME), CD4 <=200: ICD-10-CM

## 2023-02-08 DIAGNOSIS — E55.9 VITAMIN D DEFICIENCY: ICD-10-CM

## 2023-02-08 DIAGNOSIS — A74.9 CHLAMYDIA: ICD-10-CM

## 2023-02-08 RX ORDER — SULFAMETHOXAZOLE AND TRIMETHOPRIM 800; 160 MG/1; MG/1
1 TABLET ORAL DAILY
Qty: 30 TABLET | Refills: 3 | Status: SHIPPED | OUTPATIENT
Start: 2023-02-08 | End: 2023-05-16

## 2023-02-08 RX ORDER — BICTEGRAVIR SODIUM, EMTRICITABINE, AND TENOFOVIR ALAFENAMIDE FUMARATE 50; 200; 25 MG/1; MG/1; MG/1
1 TABLET ORAL DAILY
Qty: 30 TABLET | Refills: 3 | Status: SHIPPED | OUTPATIENT
Start: 2023-02-08 | End: 2023-05-16

## 2023-02-08 RX ORDER — ERGOCALCIFEROL 1.25 MG/1
50000 CAPSULE ORAL
Qty: 4 CAPSULE | Refills: 3 | Status: SHIPPED | OUTPATIENT
Start: 2023-02-08 | End: 2023-05-16

## 2023-02-08 RX ORDER — DOXYCYCLINE HYCLATE 100 MG
100 TABLET ORAL 2 TIMES DAILY
Qty: 14 TABLET | Refills: 0 | Status: SHIPPED | OUTPATIENT
Start: 2023-02-08 | End: 2023-02-15

## 2023-02-08 NOTE — TELEPHONE ENCOUNTER
Called [facility]) where meds were initially sent on 2/1/23 and was informed that meds have to be approved by their MD prior to refill, I informed her to disregard prescriptions due to pt uses [facility]. Please sign for meds to go to [facility]. Pt notified.

## 2023-02-08 NOTE — TELEPHONE ENCOUNTER
----- Message from Arleth Thomas sent at 2/8/2023 12:28 PM CST -----  Regarding: pt call  Colleen    Pt requesting meds to be sent to:  [facility] [address] [address]  Phone: [phone] Fax: [phone]

## 2023-04-19 NOTE — TELEPHONE ENCOUNTER
Is This A New Presentation, Or A Follow-Up?: Skin Lesion Patient informed of results and providers recommendations. All questions answered. Patient verbalized understanding.   Has Your Skin Lesion Been Treated?: not been treated

## 2023-06-22 ENCOUNTER — OFFICE VISIT (OUTPATIENT)
Dept: INFECTIOUS DISEASES | Facility: CLINIC | Age: 33
End: 2023-06-22
Payer: MEDICAID

## 2023-06-22 VITALS
RESPIRATION RATE: 16 BRPM | DIASTOLIC BLOOD PRESSURE: 77 MMHG | SYSTOLIC BLOOD PRESSURE: 132 MMHG | HEART RATE: 111 BPM | HEIGHT: 66 IN | BODY MASS INDEX: 24.69 KG/M2 | TEMPERATURE: 97 F | WEIGHT: 153.63 LBS

## 2023-06-22 DIAGNOSIS — E55.9 VITAMIN D DEFICIENCY: ICD-10-CM

## 2023-06-22 DIAGNOSIS — B20 AIDS (ACQUIRED IMMUNODEFICIENCY SYNDROME), CD4 <=200: Primary | ICD-10-CM

## 2023-06-22 DIAGNOSIS — F17.210 CIGARETTE NICOTINE DEPENDENCE WITHOUT COMPLICATION: ICD-10-CM

## 2023-06-22 LAB
ALBUMIN SERPL-MCNC: 4.3 G/DL (ref 3.5–5)
ALBUMIN/GLOB SERPL: 1 RATIO (ref 1.1–2)
ALP SERPL-CCNC: 108 UNIT/L (ref 40–150)
ALT SERPL-CCNC: 16 UNIT/L (ref 0–55)
AST SERPL-CCNC: 20 UNIT/L (ref 5–34)
BASOPHILS # BLD AUTO: 0.02 X10(3)/MCL
BASOPHILS NFR BLD AUTO: 0.4 %
BILIRUBIN DIRECT+TOT PNL SERPL-MCNC: 0.9 MG/DL
BUN SERPL-MCNC: 31.5 MG/DL (ref 8.9–20.6)
CALCIUM SERPL-MCNC: 9.6 MG/DL (ref 8.4–10.2)
CHLORIDE SERPL-SCNC: 102 MMOL/L (ref 98–107)
CO2 SERPL-SCNC: 26 MMOL/L (ref 22–29)
CREAT SERPL-MCNC: 0.89 MG/DL (ref 0.73–1.18)
EOSINOPHIL # BLD AUTO: 0.03 X10(3)/MCL (ref 0–0.9)
EOSINOPHIL NFR BLD AUTO: 0.5 %
ERYTHROCYTE [DISTWIDTH] IN BLOOD BY AUTOMATED COUNT: 10.9 % (ref 11.5–17)
GFR SERPLBLD CREATININE-BSD FMLA CKD-EPI: >60 MLS/MIN/1.73/M2
GLOBULIN SER-MCNC: 4.2 GM/DL (ref 2.4–3.5)
GLUCOSE SERPL-MCNC: 83 MG/DL (ref 74–100)
HCT VFR BLD AUTO: 42.8 % (ref 42–52)
HGB BLD-MCNC: 14.7 G/DL (ref 14–18)
IMM GRANULOCYTES # BLD AUTO: 0.01 X10(3)/MCL (ref 0–0.04)
IMM GRANULOCYTES NFR BLD AUTO: 0.2 %
LYMPHOCYTES # BLD AUTO: 1.87 X10(3)/MCL (ref 0.6–4.6)
LYMPHOCYTES NFR BLD AUTO: 33.7 %
MCH RBC QN AUTO: 31 PG (ref 27–31)
MCHC RBC AUTO-ENTMCNC: 34.3 G/DL (ref 33–36)
MCV RBC AUTO: 90.3 FL (ref 80–94)
MONOCYTES # BLD AUTO: 0.38 X10(3)/MCL (ref 0.1–1.3)
MONOCYTES NFR BLD AUTO: 6.8 %
NEUTROPHILS # BLD AUTO: 3.24 X10(3)/MCL (ref 2.1–9.2)
NEUTROPHILS NFR BLD AUTO: 58.4 %
NRBC BLD AUTO-RTO: 0 %
PLATELET # BLD AUTO: 182 X10(3)/MCL (ref 130–400)
PMV BLD AUTO: 12.9 FL (ref 7.4–10.4)
POTASSIUM SERPL-SCNC: 4.2 MMOL/L (ref 3.5–5.1)
PROT SERPL-MCNC: 8.5 GM/DL (ref 6.4–8.3)
RBC # BLD AUTO: 4.74 X10(6)/MCL (ref 4.7–6.1)
SODIUM SERPL-SCNC: 138 MMOL/L (ref 136–145)
WBC # SPEC AUTO: 5.55 X10(3)/MCL (ref 4.5–11.5)

## 2023-06-22 PROCEDURE — 3075F SYST BP GE 130 - 139MM HG: CPT | Mod: CPTII,,, | Performed by: NURSE PRACTITIONER

## 2023-06-22 PROCEDURE — 36415 COLL VENOUS BLD VENIPUNCTURE: CPT | Performed by: NURSE PRACTITIONER

## 2023-06-22 PROCEDURE — 3008F BODY MASS INDEX DOCD: CPT | Mod: CPTII,,, | Performed by: NURSE PRACTITIONER

## 2023-06-22 PROCEDURE — 99406 PR TOBACCO USE CESSATION INTERMEDIATE 3-10 MINUTES: ICD-10-PCS | Mod: S$PBB,,, | Performed by: NURSE PRACTITIONER

## 2023-06-22 PROCEDURE — 3078F DIAST BP <80 MM HG: CPT | Mod: CPTII,,, | Performed by: NURSE PRACTITIONER

## 2023-06-22 PROCEDURE — 1160F PR REVIEW ALL MEDS BY PRESCRIBER/CLIN PHARMACIST DOCUMENTED: ICD-10-PCS | Mod: CPTII,,, | Performed by: NURSE PRACTITIONER

## 2023-06-22 PROCEDURE — 80053 COMPREHEN METABOLIC PANEL: CPT | Performed by: NURSE PRACTITIONER

## 2023-06-22 PROCEDURE — 3075F PR MOST RECENT SYSTOLIC BLOOD PRESS GE 130-139MM HG: ICD-10-PCS | Mod: CPTII,,, | Performed by: NURSE PRACTITIONER

## 2023-06-22 PROCEDURE — 86361 T CELL ABSOLUTE COUNT: CPT | Performed by: NURSE PRACTITIONER

## 2023-06-22 PROCEDURE — 99215 PR OFFICE/OUTPT VISIT, EST, LEVL V, 40-54 MIN: ICD-10-PCS | Mod: 25,S$PBB,, | Performed by: NURSE PRACTITIONER

## 2023-06-22 PROCEDURE — 85025 COMPLETE CBC W/AUTO DIFF WBC: CPT | Performed by: NURSE PRACTITIONER

## 2023-06-22 PROCEDURE — 99214 OFFICE O/P EST MOD 30 MIN: CPT | Mod: PBBFAC | Performed by: NURSE PRACTITIONER

## 2023-06-22 PROCEDURE — 1159F PR MEDICATION LIST DOCUMENTED IN MEDICAL RECORD: ICD-10-PCS | Mod: CPTII,,, | Performed by: NURSE PRACTITIONER

## 2023-06-22 PROCEDURE — 3078F PR MOST RECENT DIASTOLIC BLOOD PRESSURE < 80 MM HG: ICD-10-PCS | Mod: CPTII,,, | Performed by: NURSE PRACTITIONER

## 2023-06-22 PROCEDURE — 87536 HIV-1 QUANT&REVRSE TRNSCRPJ: CPT | Performed by: NURSE PRACTITIONER

## 2023-06-22 PROCEDURE — 3008F PR BODY MASS INDEX (BMI) DOCUMENTED: ICD-10-PCS | Mod: CPTII,,, | Performed by: NURSE PRACTITIONER

## 2023-06-22 PROCEDURE — 1159F MED LIST DOCD IN RCRD: CPT | Mod: CPTII,,, | Performed by: NURSE PRACTITIONER

## 2023-06-22 PROCEDURE — 99406 BEHAV CHNG SMOKING 3-10 MIN: CPT | Mod: S$PBB,,, | Performed by: NURSE PRACTITIONER

## 2023-06-22 PROCEDURE — 99215 OFFICE O/P EST HI 40 MIN: CPT | Mod: 25,S$PBB,, | Performed by: NURSE PRACTITIONER

## 2023-06-22 PROCEDURE — 1160F RVW MEDS BY RX/DR IN RCRD: CPT | Mod: CPTII,,, | Performed by: NURSE PRACTITIONER

## 2023-06-22 RX ORDER — DAPSONE 100 MG/1
100 TABLET ORAL DAILY
Qty: 30 TABLET | Refills: 3 | Status: SHIPPED | OUTPATIENT
Start: 2023-06-22 | End: 2023-11-20

## 2023-06-22 RX ORDER — ERGOCALCIFEROL 1.25 MG/1
50000 CAPSULE ORAL
Qty: 4 CAPSULE | Refills: 4 | Status: SHIPPED | OUTPATIENT
Start: 2023-06-22

## 2023-06-22 RX ORDER — VILAZODONE HYDROCHLORIDE 40 MG/1
40 TABLET ORAL
COMMUNITY
Start: 2023-06-13

## 2023-06-22 RX ORDER — BICTEGRAVIR SODIUM, EMTRICITABINE, AND TENOFOVIR ALAFENAMIDE FUMARATE 50; 200; 25 MG/1; MG/1; MG/1
1 TABLET ORAL DAILY
Qty: 30 TABLET | Refills: 3 | Status: SHIPPED | OUTPATIENT
Start: 2023-06-22

## 2023-06-22 NOTE — PROGRESS NOTES
Subjective     Patient ID: Stan Odell is a 32 y.o. male.    Chief Complaint: Follow-up (B20)    6/22/23  Amber is a 31 y/o WM who identifies as a female who is here for AIDS f/u. Pt presented to appointment 1 hour late. He is accompanied by his mother Luciana Le. She tells me that coming to her ID appts gives her anxiety, because it reminds her of her dx.  Dx 4/7/22. MSM, anal receptive.  Hx of rectal gonorrhea and chlamydia. G6PD neg, HLA  neg. Pt reports 100% adherence to Biktarvy despite not attending appts. She admits to only taking a 1/2 tablet of Bactrim because she does not like the way it makes her feel. I explained that she has to take the entire dose for it to work correctly. I offered to change the medicine to dapsone. Pt is agreeable.  She is no longer taking the Ergocalciferol 50,000 units weekly, because the pharmacy is not sending it. Pt denies fever, headache, chills, visual problems, N/V/D, SOB, cough, chest pain or abd pain. Complains of anxiety.  Tells me she is seeing a counselor. Denies SI or HI.  Smokes 1/2 ppd of cigarettes and is not interested in cessation.  Reviewed labs from 2/1/23 HIV VL ND, CD4 166, Vit D 18.9, TSH 3.358, syphilis NR, quant gold neg, Hep C ab NR.  She will need repeat labs today.  Plan to hold vaccines until CD4 improves.      2/1/23  Amber is a 31 y/o WM who identifies as female who here for HIV f/u.  Dx 4/7/22. MSM, anal receptive.  Hx of rectal gonorrhea and chlamydia. G6PD neg, HLA  neg. Pt reports 100% adherence to Biktarvy despite not attending appts. She reports non-adherence to Bactrim DS. Reports adherence to Ergocalciferol 50,000 units once weekly.  Reviewed labs from 6/8/22 HIV VL [address], CD4 77, rectal CT/GC positive. Pt denies fever, headache, chills, visual problems, N/V/D, SOB, cough, or chest pain. She complains of ongoing constipation and rectal bleeding for approx 1.5 months. Pt states she used a dildo and then started  having some rectal bleeding. She is unsure if she injured something or she is has an anal fissure from the constipation. Pt will need some updated labs, x ray abdomen, repeat STI screening and FIT test today. 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  She is still having some depression and is currently seeing a counselor, but would really like to see someone who specializes in HIV.  I will provide patient with a list of counselors today.  She denies SI or HI.  Pt is due for several vaccines. However, CD4 remains to low. Pt is currently smoking 1/2 ppd og cigarettes. Smoking cessation encouraged.     6/8/22  Amber is a 31 WM who identifies as a female who presents today for initial HIV visit. Pt is accompanied by his mother Luciana Le.  Dx 4/7/22. Pt was referred by Dr. Michael Swenson.  MSM, anal receptive. No hx of STIs.  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 She tells me all sexual encounters were protected, but oral sex was preformed unprotected. Pt is  to a male, but currently . She is currently involved with a another male partner who is HIV negative.  HIV PrEP discussed for partner.  Hx of 1 tattoo and multiple piercings all of which were professional. Tells me she was tested 6-9 months ago for HIV and was negative. Smokes THC 2-3 x daily, smokes 1/2 cigs per day, and is drinking hard liquor daily to cope. Pt encouraged to see a counselor. She has one so she will call and schedule. Pt denies HI or SI.  Reviewed labs from 4/28/22 HIV VL 41912, CD4 83, HIV GT susceptible to all NRTIs, NNRTIs, PI, Integrase. Only mutation noted was 128T, T Spot neg, cryptococcal antigen neg. CMV IGG +, Toxo IGG neg, Hep A IGG +, Hep B surface ab neg, TSH 0.9578, Vit D 17.1, HLA  neg, G6PD neg, lipid WNL, UDS + amphetamines, benzodiazepines, THC, and opiates.  Pt denies fever,  headache, chills, visual problems, N/V/D, SOB, cough, chest pain or abd pain. Pt tells me she has lost a significant amount of weight over the last few months.  She will need a fundus photo and STI screening today. She verbalized understanding.      Review of Systems   Constitutional: Negative.    HENT: Negative.     Eyes: Negative.    Respiratory: Negative.     Cardiovascular: Negative.    Gastrointestinal: Negative.    Genitourinary: Negative.    Musculoskeletal: Negative.    Integumentary:  Negative.   Neurological: Negative.    Psychiatric/Behavioral: Negative.          Objective     Physical Exam  Vitals reviewed.   Constitutional:       General: He is not in acute distress.     Appearance: Normal appearance.   HENT:      Head: Normocephalic.      Right Ear: External ear normal.      Left Ear: External ear normal.      Nose: Nose normal.      Mouth/Throat:      Mouth: Mucous membranes are moist.      Pharynx: Oropharynx is clear.   Eyes:      General: No scleral icterus.     Extraocular Movements: Extraocular movements intact.      Conjunctiva/sclera: Conjunctivae normal.      Pupils: Pupils are equal, round, and reactive to light.   Cardiovascular:      Rate and Rhythm: Normal rate and regular rhythm.      Pulses: Normal pulses.      Heart sounds: Normal heart sounds.   Pulmonary:      Effort: Pulmonary effort is normal. No respiratory distress.      Breath sounds: Normal breath sounds.   Abdominal:      General: Bowel sounds are normal. There is no distension.      Palpations: Abdomen is soft. There is no mass.      Tenderness: There is no abdominal tenderness. There is no right CVA tenderness or left CVA tenderness.      Hernia: No hernia is present.   Musculoskeletal:         General: No tenderness or signs of injury. Normal range of motion.      Cervical back: Normal range of motion and neck supple.      Right lower leg: No edema.      Left lower leg: No edema.   Lymphadenopathy:      Cervical: No cervical  adenopathy.   Skin:     General: Skin is warm and dry.      Capillary Refill: Capillary refill takes less than 2 seconds.      Findings: No erythema or lesion.   Neurological:      General: No focal deficit present.      Mental Status: He is alert and oriented to person, place, and time. Mental status is at baseline.   Psychiatric:         Mood and Affect: Mood normal.         Behavior: Behavior normal.         Thought Content: Thought content normal.         Judgment: Judgment normal.          Assessment and Plan     1. AIDS (acquired immunodeficiency syndrome), CD4 <=200  -     rhwlcvqet-gxccgpkb-ckxbjec ala (BIKTARVY) -25 mg (25 kg or greater); Take 1 tablet by mouth once daily.  Dispense: 30 tablet; Refill: 3  -     dapsone 100 MG Tab; Take 1 tablet (100 mg total) by mouth once daily. Discontinue Bactrim DS  Dispense: 30 tablet; Refill: 3  -     HIV-1 RNA, Quantitative, PCR with Reflex to Genotype; Future; Expected date: 06/22/2023  -     CD4 Lymphocytes; Future; Expected date: 06/22/2023  -     Comprehensive Metabolic Panel; Future; Expected date: 06/22/2023  -     CBC Auto Differential; Future; Expected date: 06/22/2023  Continue Biktarvy 1 po q day. Stop Bactrim DS 1 po q day and start Dapsone 100 mg 1 po day for OI prophylaxis.  Pt encouraged to wash all fruits and veggies. Cook meat thoroughly. Avoid handling litter boxes are gardening at this time.    RTC 3 months with Colleen for an in office visit, labs today  STI screening repeated 2/1/23  Fundus photo ordered at last visit  Hold vaccines until CD4 improves    2. Vitamin D deficiency  -     ergocalciferol (ERGOCALCIFEROL) 50,000 unit Cap; Take 1 capsule (50,000 Units total) by mouth every 7 days.  Dispense: 4 capsule; Refill: 4  Continue meds. Pt educated on importance of Vit D to bone and organ health.     3. Cigarette nicotine dependence without complication  Spent approx 3 minutes discussing smoking cessation   Pt is not ready to quit.  Discussed  benefits of quitting: improved overall health, decreased cardiac/vascular/pulmonary/stroke risks, as well as cost savings.      I spent a total of 58 minutes on the day of the visit.This includes face to face time and non-face to face time preparing to see the patient (eg, review of tests), obtaining and/or reviewing separately obtained history, documenting clinical information in the electronic or other health record, independently interpreting results and communicating results to the patient/family/caregiver, or care coordinator.

## 2023-06-23 LAB
AGE: 30
CD3+CD4+ CELLS # SPEC: 207.61 UNIT/L (ref 589–1505)
CD3+CD4+ CELLS NFR BLD: 11.1 %
LYMPHOCYTES # BLD AUTO: 1870.35 X10(3)/MCL (ref 1260–5520)
LYMPHOCYTES NFR LN MANUAL: 33.7 % (ref 28–48)
LYMPHOMA - T-CELL MARKERS SPEC-IMP: ABNORMAL
WBC # BLD AUTO: 5550 /MM3 (ref 4500–11500)

## 2023-06-24 LAB — HIV1 RNA # PLAS NAA DL=20: <20 COPIES/ML

## 2023-08-03 ENCOUNTER — HOSPITAL ENCOUNTER (EMERGENCY)
Facility: HOSPITAL | Age: 33
Discharge: HOME | End: 2023-08-03
Attending: FAMILY MEDICINE
Payer: MEDICAID

## 2023-08-03 VITALS
BODY MASS INDEX: 21.98 KG/M2 | TEMPERATURE: 98 F | DIASTOLIC BLOOD PRESSURE: 90 MMHG | HEART RATE: 100 BPM | RESPIRATION RATE: 20 BRPM | HEIGHT: 68 IN | SYSTOLIC BLOOD PRESSURE: 124 MMHG | WEIGHT: 145 LBS | OXYGEN SATURATION: 95 %

## 2023-08-03 DIAGNOSIS — T40.601A OPIATE OVERDOSE, ACCIDENTAL OR UNINTENTIONAL, INITIAL ENCOUNTER: Primary | ICD-10-CM

## 2023-08-03 LAB
AMPHET UR QL SCN: POSITIVE
BARBITURATE SCN PRESENT UR: NEGATIVE
BENZODIAZ UR QL SCN: POSITIVE
CANNABINOIDS UR QL SCN: POSITIVE
COCAINE UR QL SCN: NEGATIVE
FENTANYL UR QL SCN: POSITIVE
MDMA UR QL SCN: NEGATIVE
OPIATES UR QL SCN: POSITIVE
PCP UR QL: NEGATIVE
PH UR: 6 [PH] (ref 3–11)

## 2023-08-03 PROCEDURE — 99283 EMERGENCY DEPT VISIT LOW MDM: CPT

## 2023-08-03 PROCEDURE — 80307 DRUG TEST PRSMV CHEM ANLYZR: CPT | Performed by: FAMILY MEDICINE

## 2023-08-03 RX ORDER — NALOXONE HYDROCHLORIDE 4 MG/.1ML
SPRAY NASAL
Qty: 1 EACH | Refills: 11 | Status: SHIPPED | OUTPATIENT
Start: 2023-08-03

## 2023-08-03 NOTE — ED PROVIDER NOTES
"Encounter Date: 8/3/2023       History     Chief Complaint   Patient presents with    Drug Overdose     Pt found breathing "agonally" per ems onscene. Received 4 mg of Narcan enroute. Alert and awake upon arrival. [place]     Patient is a 32-year-old male who identifies as a female presents emergency room brought in by ambulance due to suspected overdose.  Patient admits using methamphetamines last night, but was found unresponsive and apneic.  Patient received 2 doses of Narcan intranasally and awoke after the 2nd dose.  Patient is currently awake and alert.  Denies suicidal or homicidal ideations.  Denies any opioid use, but possibly drugs that were used were laced with fentanyl.  Patient denies chest pain.  Denies shortness of breath.  Denies nausea or vomiting    The history is provided by the patient and the EMS personnel.     Review of patient's allergies indicates:  No Known Allergies  Past Medical History:   Diagnosis Date    ADHD (attention deficit hyperactivity disorder)     HIV infection      Past Surgical History:   Procedure Laterality Date    TONSILLECTOMY       Family History   Problem Relation Age of Onset    No Known Problems Mother     No Known Problems Father     No Known Problems Brother      Social History     Tobacco Use    Smoking status: Every Day     Current packs/day: 0.50     Average packs/day: 0.5 packs/day for 15.0 years (7.5 ttl pk-yrs)     Types: Cigarettes    Smokeless tobacco: Never   Substance Use Topics    Alcohol use: Yes     Alcohol/week: 5.0 standard drinks of alcohol     Types: 5 Shots of liquor per week    Drug use: Yes     Types: Marijuana     Review of Systems   Constitutional:  Negative for chills, fatigue and fever.   HENT:  Negative for ear pain, rhinorrhea and sore throat.    Eyes:  Negative for photophobia and pain.   Respiratory:  Negative for cough, shortness of breath and wheezing.    Cardiovascular:  Negative for chest pain.   Gastrointestinal:  " Negative for abdominal pain, diarrhea, nausea and vomiting.   Genitourinary:  Negative for dysuria.   Neurological:  Negative for dizziness, weakness and headaches.   All other systems reviewed and are negative.      Physical Exam     Initial Vitals [08/03/23 0618]   BP Pulse Resp Temp SpO2   (!) 124/90 (!) 114 16 97.7 °F (36.5 °C) 97 %      MAP       --         Physical Exam    Nursing note and vitals reviewed.  Constitutional: She appears well-developed and well-nourished.   HENT:   Head: Normocephalic and atraumatic.   Eyes: EOM are normal. Pupils are equal, round, and reactive to light.   Neck: Neck supple.   Normal range of motion.  Cardiovascular:  Regular rhythm, normal heart sounds and intact distal pulses.   Tachycardia present.   Exam reveals no gallop and no friction rub.       No murmur heard.  Pulmonary/Chest: Breath sounds normal. No respiratory distress.   Abdominal: Abdomen is soft. Bowel sounds are normal. She exhibits no distension. There is no abdominal tenderness.   Musculoskeletal:         General: Normal range of motion.      Cervical back: Normal range of motion and neck supple.     Neurological: She is alert and oriented to person, place, and time. She has normal strength.   Skin: Skin is warm and dry. Capillary refill takes less than 2 seconds.   Psychiatric: Judgment and thought content normal. Her mood appears anxious. Her speech is rapid and/or pressured. She is hyperactive. She expresses no homicidal and no suicidal ideation.       ED Course   Procedures  Labs Reviewed   DRUG SCREEN, URINE (BEAKER) - Abnormal; Notable for the following components:       Result Value    Amphetamines, Urine Positive (*)     Benzodiazepine, Urine Positive (*)     Cannabinoids, Urine Positive (*)     Fentanyl, Urine Positive (*)     Opiates, Urine Positive (*)     All other components within normal limits    Narrative:     Cut off concentrations:    Amphetamines - 1000 ng/ml  Barbiturates - 200  ng/ml  Benzodiazepine - 200 ng/ml  Cannabinoids (THC) - 50 ng/ml  Cocaine - 300 ng/ml  Fentanyl - 1.0 ng/ml  MDMA - 500 ng/ml  Opiates - 300 ng/ml   Phencyclidine (PCP) - 25 ng/ml    Specimen submitted for drug analysis and tested for pH and specific gravity in order to evaluate sample integrity. Suspect tampering if specific gravity is <1.003 and/or pH is not within the range of 4.5 - 8.0  False negatives may result form substances such as bleach added to urine.  False positives may result for the presence of a substance with similar chemical structure to the drug or its metabolite.    This test provides only a PRELIMINARY analytical test result. A more specific alternate chemical method must be used in order to obtain a confirmed analytical result. Gas chromatography/mass spectrometry (GC/MS) is the preferred confirmatory method. Other chemical confirmation methods are available. Clinical consideration and professional judgement should be applied to any drug of abuse test result, particularly when preliminary positive results are used.    Positive results will be confirmed only at the physicians request. Unconfirmed screening results are to be used only for medical purposes (treatment).               Imaging Results    None          Medications - No data to display  Medical Decision Making:   Initial Assessment:   Patient is a 32-year-old white male who identifies as a female brought in the emergency room secondary to suspected overdose, received 2 doses of Narcan intranasally for a total of 4 mg of Narcan.  Patient currently awake and alert, very nervous due to situation.  Will continue to monitor patient in the emergency room.  Will obtain urine drug screen.  Differential Diagnosis:   Opioid overdose, Medication Overdose  Clinical Tests:   Lab Tests: Reviewed  ED Management:  Patient was initially seen by my partner Dr. Peña.  I assumed care of the patient upon conclusion of his shift.  Patient upon  multiple examinations over several hours remained awake and alert.  Voiced no suicidal or homicidal ideations.  Was anxious however easily calmed and redirectable.  Friend bedside.  Patient's mentation did not decline at any point.  Remained completely awake and alert, was able to drink and eat bedside without any difficulties.  Prescribed Narcan.  Strict return precautions provided.  Did inform of UDS results which they were grateful for. ultimately stable for discharge.  Released. (Ashleigh)              ED Course as of 08/03/23 2309   Thu Aug 03, 2023   0647 Patient transitioned to Dr. Sotelo. [MW]   0842 Patient is sitting up comfortably.  Completely awake alert and oriented.  Friend bedside.  Requesting food and drink and provided.  Tolerating without problems [RZ]      ED Course User Index  [MW] Lars Peña MD  [RZ] Omar Sotelo MD                 Clinical Impression:   Final diagnoses:  [T40.601A] Opiate overdose, accidental or unintentional, initial encounter (Primary)        ED Disposition Condition    Discharge Stable          ED Prescriptions       Medication Sig Dispense Start Date End Date Auth. Provider    naloxone (NARCAN) 4 mg/actuation Spry 4mg by nasal route as needed for opioid overdose; may repeat every 2-3 minutes in alternating nostrils until medical help arrives. Call 911 1 each 8/3/2023 -- Omar Sotelo MD          Follow-up Information       Follow up With Specialties Details Why Contact Info [facility] Emergency Medicine  As needed, If symptoms worsen [address] [facility] [address] [phone]    Primary  Schedule an appointment as soon as possible for a visit in 1 week               Omar Sotelo MD  08/03/23 7413

## 2023-11-20 DIAGNOSIS — B20 AIDS (ACQUIRED IMMUNODEFICIENCY SYNDROME), CD4 <=200: ICD-10-CM

## 2023-11-20 RX ORDER — DAPSONE 100 MG/1
TABLET ORAL
Qty: 30 TABLET | Refills: 1 | Status: SHIPPED | OUTPATIENT
Start: 2023-11-20

## 2024-02-20 ENCOUNTER — TELEPHONE (OUTPATIENT)
Dept: INFECTIOUS DISEASES | Facility: CLINIC | Age: 34
End: 2024-02-20
Payer: MEDICAID

## 2024-02-20 ENCOUNTER — DOCUMENTATION ONLY (OUTPATIENT)
Dept: INFECTIOUS DISEASES | Facility: CLINIC | Age: 34
End: 2024-02-20
Payer: MEDICAID

## 2024-02-20 NOTE — TELEPHONE ENCOUNTER
I called to schedule HIV follow up. Appointment needed letter not mailed since last communication mailed to the pt was returned.